# Patient Record
Sex: FEMALE | Race: OTHER | NOT HISPANIC OR LATINO | URBAN - METROPOLITAN AREA
[De-identification: names, ages, dates, MRNs, and addresses within clinical notes are randomized per-mention and may not be internally consistent; named-entity substitution may affect disease eponyms.]

---

## 2021-05-07 ENCOUNTER — APPOINTMENT (RX ONLY)
Dept: URBAN - METROPOLITAN AREA OTHER 5 | Facility: OTHER | Age: 36
Setting detail: DERMATOLOGY
End: 2021-05-07

## 2021-05-07 DIAGNOSIS — L71.0 PERIORAL DERMATITIS: ICD-10-CM | Status: INADEQUATELY CONTROLLED

## 2021-05-07 DIAGNOSIS — L70.0 ACNE VULGARIS: ICD-10-CM

## 2021-05-07 PROCEDURE — ? COUNSELING

## 2021-05-07 PROCEDURE — 99204 OFFICE O/P NEW MOD 45 MIN: CPT

## 2021-05-07 PROCEDURE — ? PRESCRIPTION

## 2021-05-07 PROCEDURE — ? TREATMENT REGIMEN

## 2021-05-07 RX ORDER — CLINDAMYCIN PHOSPHATE 10 MG/ML
LOTION TOPICAL BID
Qty: 60 | Refills: 2 | Status: ERX | COMMUNITY
Start: 2021-05-07

## 2021-05-07 RX ORDER — FLUCONAZOLE 150 MG/1
TABLET ORAL
Qty: 2 | Refills: 1 | Status: ERX | COMMUNITY
Start: 2021-05-07

## 2021-05-07 RX ORDER — DOXYCYCLINE HYCLATE 100 MG/1
TABLET, COATED ORAL QD
Qty: 30 | Refills: 3 | Status: ERX | COMMUNITY
Start: 2021-05-07

## 2021-05-07 RX ORDER — TRETIONIN 0.5 MG/G
CREAM TOPICAL QHS
Qty: 45 | Refills: 6 | Status: ERX | COMMUNITY
Start: 2021-05-07

## 2021-05-07 RX ORDER — AZELAIC ACID 0.15 G/G
GEL TOPICAL
Qty: 50 | Refills: 6 | Status: ERX | COMMUNITY
Start: 2021-05-07

## 2021-05-07 RX ADMIN — TRETIONIN: 0.5 CREAM TOPICAL at 00:00

## 2021-05-07 RX ADMIN — AZELAIC ACID: 0.15 GEL TOPICAL at 00:00

## 2021-05-07 RX ADMIN — FLUCONAZOLE: 150 TABLET ORAL at 00:00

## 2021-05-07 RX ADMIN — CLINDAMYCIN PHOSPHATE: 10 LOTION TOPICAL at 00:00

## 2021-05-07 RX ADMIN — DOXYCYCLINE HYCLATE: 100 TABLET, COATED ORAL at 00:00

## 2021-05-07 ASSESSMENT — LOCATION DETAILED DESCRIPTION DERM
LOCATION DETAILED: RIGHT MEDIAL BUCCAL CHEEK
LOCATION DETAILED: LEFT UPPER CUTANEOUS LIP
LOCATION DETAILED: RIGHT NASOLABIAL FOLD
LOCATION DETAILED: LEFT FOREHEAD
LOCATION DETAILED: RIGHT CHIN
LOCATION DETAILED: LEFT CENTRAL BUCCAL CHEEK
LOCATION DETAILED: GLABELLA
LOCATION DETAILED: RIGHT SUPERIOR MEDIAL BUCCAL CHEEK
LOCATION DETAILED: RIGHT FOREHEAD
LOCATION DETAILED: RIGHT INFERIOR MEDIAL MALAR CHEEK

## 2021-05-07 ASSESSMENT — LOCATION SIMPLE DESCRIPTION DERM
LOCATION SIMPLE: GLABELLA
LOCATION SIMPLE: LEFT LIP
LOCATION SIMPLE: LEFT FOREHEAD
LOCATION SIMPLE: RIGHT FOREHEAD
LOCATION SIMPLE: CHIN
LOCATION SIMPLE: RIGHT CHEEK
LOCATION SIMPLE: RIGHT LIP
LOCATION SIMPLE: LEFT CHEEK

## 2021-05-07 ASSESSMENT — INVESTIGATOR STATIC GLOBAL ASSESSMENT
IN YOUR EXPERIENCE, AMONG ALL PATIENTS YOU HAVE SEEN WITH THIS CONDITION, HOW SEVERE IS THIS PATIENT'S CONDITION?: MILD TO MODERATE

## 2021-05-07 ASSESSMENT — LOCATION ZONE DERM
LOCATION ZONE: LIP
LOCATION ZONE: FACE

## 2021-05-07 NOTE — PROCEDURE: COUNSELING
Detail Level: Simple
Birth Control Pills Counseling: Birth Control Pill Counseling: I discussed with the patient the potential side effects of OCPs including but not limited to increased risk of stroke, heart attack, thrombophlebitis, deep venous thrombosis, hepatic adenomas, breast changes, GI upset, headaches, and depression.  The patient verbalized understanding of the proper use and possible adverse effects of OCPs. All of the patient's questions and concerns were addressed.
Topical Clindamycin Pregnancy And Lactation Text: This medication is Pregnancy Category B and is considered safe during pregnancy. It is unknown if it is excreted in breast milk.
Topical Retinoid Pregnancy And Lactation Text: This medication is Pregnancy Category C. It is unknown if this medication is excreted in breast milk.
Azithromycin Counseling:  I discussed with the patient the risks of azithromycin including but not limited to GI upset, allergic reaction, drug rash, diarrhea, and yeast infections.
Tetracycline Pregnancy And Lactation Text: This medication is Pregnancy Category D and not consider safe during pregnancy. It is also excreted in breast milk.
High Dose Vitamin A Pregnancy And Lactation Text: High dose vitamin A therapy is contraindicated during pregnancy and breast feeding.
Erythromycin Pregnancy And Lactation Text: This medication is Pregnancy Category B and is considered safe during pregnancy. It is also excreted in breast milk.
Dapsone Pregnancy And Lactation Text: This medication is Pregnancy Category C and is not considered safe during pregnancy or breast feeding.
Topical Clindamycin Counseling: Patient counseled that this medication may cause skin irritation or allergic reactions.  In the event of skin irritation, the patient was advised to reduce the amount of the drug applied or use it less frequently.   The patient verbalized understanding of the proper use and possible adverse effects of clindamycin.  All of the patient's questions and concerns were addressed.
Tetracycline Counseling: Patient counseled regarding possible photosensitivity and increased risk for sunburn.  Patient instructed to avoid sunlight, if possible.  When exposed to sunlight, patients should wear protective clothing, sunglasses, and sunscreen.  The patient was instructed to call the office immediately if the following severe adverse effects occur:  hearing changes, easy bruising/bleeding, severe headache, or vision changes.  The patient verbalized understanding of the proper use and possible adverse effects of tetracycline.  All of the patient's questions and concerns were addressed. Patient understands to avoid pregnancy while on therapy due to potential birth defects.
Topical Retinoid counseling:  Patient advised to apply a pea-sized amount only at bedtime and wait 30 minutes after washing their face before applying.  If too drying, patient may add a non-comedogenic moisturizer. The patient verbalized understanding of the proper use and possible adverse effects of retinoids.  All of the patient's questions and concerns were addressed.
Sarecycline Counseling: Patient advised regarding possible photosensitivity and discoloration of the teeth, skin, lips, tongue and gums.  Patient instructed to avoid sunlight, if possible.  When exposed to sunlight, patients should wear protective clothing, sunglasses, and sunscreen.  The patient was instructed to call the office immediately if the following severe adverse effects occur:  hearing changes, easy bruising/bleeding, severe headache, or vision changes.  The patient verbalized understanding of the proper use and possible adverse effects of sarecycline.  All of the patient's questions and concerns were addressed.
Bactrim Pregnancy And Lactation Text: This medication is Pregnancy Category D and is known to cause fetal risk.  It is also excreted in breast milk.
High Dose Vitamin A Counseling: Side effects reviewed, pt to contact office should one occur.
Erythromycin Counseling:  I discussed with the patient the risks of erythromycin including but not limited to GI upset, allergic reaction, drug rash, diarrhea, increase in liver enzymes, and yeast infections.
Bactrim Counseling:  I discussed with the patient the risks of sulfa antibiotics including but not limited to GI upset, allergic reaction, drug rash, diarrhea, dizziness, photosensitivity, and yeast infections.  Rarely, more serious reactions can occur including but not limited to aplastic anemia, agranulocytosis, methemoglobinemia, blood dyscrasias, liver or kidney failure, lung infiltrates or desquamative/blistering drug rashes.
Dapsone Counseling: I discussed with the patient the risks of dapsone including but not limited to hemolytic anemia, agranulocytosis, rashes, methemoglobinemia, kidney failure, peripheral neuropathy, headaches, GI upset, and liver toxicity.  Patients who start dapsone require monitoring including baseline LFTs and weekly CBCs for the first month, then every month thereafter.  The patient verbalized understanding of the proper use and possible adverse effects of dapsone.  All of the patient's questions and concerns were addressed.
Topical Sulfur Applications Pregnancy And Lactation Text: This medication is Pregnancy Category C and has an unknown safety profile during pregnancy. It is unknown if this topical medication is excreted in breast milk.
Benzoyl Peroxide Pregnancy And Lactation Text: This medication is Pregnancy Category C. It is unknown if benzoyl peroxide is excreted in breast milk.
Tazorac Pregnancy And Lactation Text: This medication is not safe during pregnancy. It is unknown if this medication is excreted in breast milk.
Spironolactone Pregnancy And Lactation Text: This medication can cause feminization of the male fetus and should be avoided during pregnancy. The active metabolite is also found in breast milk.
Include Pregnancy/Lactation Warning?: No
Doxycycline Pregnancy And Lactation Text: This medication is Pregnancy Category D and not consider safe during pregnancy. It is also excreted in breast milk but is considered safe for shorter treatment courses.
Isotretinoin Pregnancy And Lactation Text: This medication is Pregnancy Category X and is considered extremely dangerous during pregnancy. It is unknown if it is excreted in breast milk.
Tazorac Counseling:  Patient advised that medication is irritating and drying.  Patient may need to apply sparingly and wash off after an hour before eventually leaving it on overnight.  The patient verbalized understanding of the proper use and possible adverse effects of tazorac.  All of the patient's questions and concerns were addressed.
Topical Sulfur Applications Counseling: Topical Sulfur Counseling: Patient counseled that this medication may cause skin irritation or allergic reactions.  In the event of skin irritation, the patient was advised to reduce the amount of the drug applied or use it less frequently.   The patient verbalized understanding of the proper use and possible adverse effects of topical sulfur application.  All of the patient's questions and concerns were addressed.
Benzoyl Peroxide Counseling: Patient counseled that medicine may cause skin irritation and bleach clothing.  In the event of skin irritation, the patient was advised to reduce the amount of the drug applied or use it less frequently.   The patient verbalized understanding of the proper use and possible adverse effects of benzoyl peroxide.  All of the patient's questions and concerns were addressed.
Birth Control Pills Pregnancy And Lactation Text: This medication should be avoided if pregnant and for the first 30 days post-partum.
Spironolactone Counseling: Patient advised regarding risks of diarrhea, abdominal pain, hyperkalemia, birth defects (for female patients), liver toxicity and renal toxicity. The patient may need blood work to monitor liver and kidney function and potassium levels while on therapy. The patient verbalized understanding of the proper use and possible adverse effects of spironolactone.  All of the patient's questions and concerns were addressed.
Azithromycin Pregnancy And Lactation Text: This medication is considered safe during pregnancy and is also secreted in breast milk.
Isotretinoin Counseling: Patient should get monthly blood tests, not donate blood, not drive at night if vision affected, not share medication, and not undergo elective surgery for 6 months after tx completed. Side effects reviewed, pt to contact office should one occur.
Detail Level: Zone
Minocycline Counseling: Patient advised regarding possible photosensitivity and discoloration of the teeth, skin, lips, tongue and gums.  Patient instructed to avoid sunlight, if possible.  When exposed to sunlight, patients should wear protective clothing, sunglasses, and sunscreen.  The patient was instructed to call the office immediately if the following severe adverse effects occur:  hearing changes, easy bruising/bleeding, severe headache, or vision changes.  The patient verbalized understanding of the proper use and possible adverse effects of minocycline.  All of the patient's questions and concerns were addressed.
Doxycycline Counseling:  Patient counseled regarding possible photosensitivity and increased risk for sunburn.  Patient instructed to avoid sunlight, if possible.  When exposed to sunlight, patients should wear protective clothing, sunglasses, and sunscreen.  The patient was instructed to call the office immediately if the following severe adverse effects occur:  hearing changes, easy bruising/bleeding, severe headache, or vision changes.  The patient verbalized understanding of the proper use and possible adverse effects of doxycycline.  All of the patient's questions and concerns were addressed.

## 2021-05-07 NOTE — PROCEDURE: TREATMENT REGIMEN
Initiate Treatment: Clindamycin to the lower face in the morning \\nDoxycycline 100mg 6-8wks
Detail Level: Simple
Initiate Treatment: Tretinoin 0.05 qhs\\nAzaleic acid 15% qam\\nSodium sulfacetamide cleanser qd

## 2021-05-12 ENCOUNTER — RX ONLY (OUTPATIENT)
Age: 36
Setting detail: RX ONLY
End: 2021-05-12

## 2021-05-12 RX ORDER — SULFACETAMIDE SODIUM, SULFUR 98; 48 MG/G; MG/G
LIQUID TOPICAL
Qty: 1 | Refills: 1 | Status: ERX | COMMUNITY
Start: 2021-05-12

## 2021-05-20 RX ORDER — DOXYCYCLINE HYCLATE 100 MG/1
TABLET, COATED ORAL QD
Qty: 90 | Refills: 0 | Status: ERX

## 2021-05-20 RX ORDER — TRETIONIN 0.5 MG/G
CREAM TOPICAL QHS
Qty: 3 | Refills: 0 | Status: ERX

## 2021-05-20 RX ORDER — CLINDAMYCIN PHOSPHATE 10 MG/ML
LOTION TOPICAL BID
Qty: 3 | Refills: 0 | Status: ERX

## 2021-07-06 ENCOUNTER — APPOINTMENT (RX ONLY)
Dept: URBAN - METROPOLITAN AREA OTHER 5 | Facility: OTHER | Age: 36
Setting detail: DERMATOLOGY
End: 2021-07-06

## 2021-07-06 DIAGNOSIS — L70.0 ACNE VULGARIS: ICD-10-CM

## 2021-07-06 DIAGNOSIS — L71.0 PERIORAL DERMATITIS: ICD-10-CM

## 2021-07-06 PROCEDURE — 99214 OFFICE O/P EST MOD 30 MIN: CPT

## 2021-07-06 PROCEDURE — ? TREATMENT REGIMEN

## 2021-07-06 PROCEDURE — ? PRESCRIPTION

## 2021-07-06 PROCEDURE — ? COUNSELING

## 2021-07-06 RX ORDER — FLUCONAZOLE 150 MG/1
TABLET ORAL
Qty: 2 | Refills: 1 | Status: ERX

## 2021-07-06 RX ORDER — DOXYCYCLINE HYCLATE 100 MG/1
TABLET, COATED ORAL QD
Qty: 90 | Refills: 0 | Status: ERX

## 2021-07-06 RX ORDER — DOXYCYCLINE 40 MG/1
CAPSULE ORAL QD
Qty: 90 | Refills: 0 | Status: ERX | COMMUNITY
Start: 2021-07-06

## 2021-07-06 RX ADMIN — DOXYCYCLINE: 40 CAPSULE ORAL at 00:00

## 2021-07-06 ASSESSMENT — LOCATION SIMPLE DESCRIPTION DERM
LOCATION SIMPLE: RIGHT LIP
LOCATION SIMPLE: LEFT CHEEK
LOCATION SIMPLE: RIGHT CHEEK
LOCATION SIMPLE: GLABELLA
LOCATION SIMPLE: RIGHT FOREHEAD
LOCATION SIMPLE: CHIN
LOCATION SIMPLE: LEFT FOREHEAD
LOCATION SIMPLE: LEFT LIP

## 2021-07-06 ASSESSMENT — LOCATION DETAILED DESCRIPTION DERM
LOCATION DETAILED: LEFT CENTRAL BUCCAL CHEEK
LOCATION DETAILED: RIGHT CHIN
LOCATION DETAILED: RIGHT INFERIOR MEDIAL MALAR CHEEK
LOCATION DETAILED: RIGHT NASOLABIAL FOLD
LOCATION DETAILED: GLABELLA
LOCATION DETAILED: LEFT FOREHEAD
LOCATION DETAILED: RIGHT MEDIAL BUCCAL CHEEK
LOCATION DETAILED: LEFT UPPER CUTANEOUS LIP
LOCATION DETAILED: RIGHT FOREHEAD
LOCATION DETAILED: RIGHT SUPERIOR MEDIAL BUCCAL CHEEK

## 2021-07-06 ASSESSMENT — LOCATION ZONE DERM
LOCATION ZONE: FACE
LOCATION ZONE: LIP

## 2021-07-06 NOTE — PROCEDURE: TREATMENT REGIMEN
Detail Level: Simple
Modify Regimen: Doxycycline step down, continue 100mg until more clear then maintain  40mg
Discontinue Regimen: Tretinoin and sulfa if trying to get pregnant
Continue Regimen: Tretinoin 0.05 qhs (mix with moisturizer if irritating)\\n\\nAzaleic acid 15% qam\\nSodium sulfacetamide cleanser qd

## 2021-08-20 ENCOUNTER — RX ONLY (OUTPATIENT)
Age: 36
Setting detail: RX ONLY
End: 2021-08-20

## 2021-08-20 RX ORDER — DOXYCYCLINE HYCLATE 100 MG/1
TABLET, COATED ORAL QD
Qty: 30 | Refills: 0 | Status: ERX

## 2021-08-20 RX ORDER — FLUCONAZOLE 200 MG/1
TABLET ORAL
Qty: 4 | Refills: 0 | Status: ERX | COMMUNITY
Start: 2021-08-20

## 2021-08-20 RX ORDER — TRETIONIN 0.5 MG/G
CREAM TOPICAL QHS
Qty: 3 | Refills: 0 | Status: ERX

## 2021-12-01 RX ORDER — DOXYCYCLINE HYCLATE 100 MG/1
TABLET, COATED ORAL QD
Qty: 30 | Refills: 0 | Status: ERX

## 2021-12-14 ENCOUNTER — APPOINTMENT (RX ONLY)
Dept: URBAN - METROPOLITAN AREA OTHER 5 | Facility: OTHER | Age: 36
Setting detail: DERMATOLOGY
End: 2021-12-14

## 2022-02-14 ENCOUNTER — RX ONLY (OUTPATIENT)
Age: 37
Setting detail: RX ONLY
End: 2022-02-14

## 2022-02-14 RX ORDER — FLUCONAZOLE 200 MG/1
TABLET ORAL
Qty: 1 | Refills: 0 | Status: ERX

## 2022-02-14 RX ORDER — TRETIONIN 0.5 MG/G
CREAM TOPICAL QHS
Qty: 45 | Refills: 0 | Status: ERX